# Patient Record
Sex: FEMALE | Race: ASIAN | Employment: OTHER | URBAN - METROPOLITAN AREA
[De-identification: names, ages, dates, MRNs, and addresses within clinical notes are randomized per-mention and may not be internally consistent; named-entity substitution may affect disease eponyms.]

---

## 2019-07-23 ENCOUNTER — HOSPITAL ENCOUNTER (OUTPATIENT)
Dept: ULTRASOUND IMAGING | Age: 34
Discharge: HOME | End: 2019-07-23
Payer: COMMERCIAL

## 2019-07-23 DIAGNOSIS — R60.0 LOCALIZED EDEMA: ICD-10-CM

## 2019-07-23 PROCEDURE — 93971 EXTREMITY STUDY: CPT

## 2020-07-29 ENCOUNTER — OFFICE VISIT (OUTPATIENT)
Dept: INTERNAL MEDICINE CLINIC | Age: 35
End: 2020-07-29

## 2020-07-29 VITALS
SYSTOLIC BLOOD PRESSURE: 93 MMHG | HEIGHT: 63 IN | BODY MASS INDEX: 33.42 KG/M2 | DIASTOLIC BLOOD PRESSURE: 62 MMHG | OXYGEN SATURATION: 100 % | HEART RATE: 67 BPM | WEIGHT: 188.6 LBS

## 2020-07-29 DIAGNOSIS — M25.572 ACUTE LEFT ANKLE PAIN: Primary | ICD-10-CM

## 2020-07-29 DIAGNOSIS — M79.89 LEFT LEG SWELLING: ICD-10-CM

## 2020-07-29 RX ORDER — LEVOTHYROXINE SODIUM 125 UG/1
TABLET ORAL
COMMUNITY

## 2020-07-29 NOTE — PROGRESS NOTES
Chief Complaint   Patient presents with    Ankle swelling     left ankle swelling x 6/2020, no injury     she is a 37y.o. year old female who presents for evaluation of left ankle swelling. Pain Assessment Encounter      Neel Guillaume  7/29/2020  Onset of Symptoms: 6/2020  ________________________________________________________________________  Description:pt states her ankle started swelling out of nowhere. Pt states hx of this happening 7/2019-12/2019 then swelling went away and came back 6/2020. No specific injury per patient. No pain, just limited movement. Frequency: more than 5 times a day  Pain Scale:(1-10): 0   Trauma Hx: none  Hx of similar symptoms: Yes  Radiation: NO  Duration:  continuous      Progression: is unchanged  What makes it better?: elevation   What makes it worse?: standing all day, moving around  Medications tried: none    Reviewed and agree with Nurse Note and duplicated in this note. Reviewed PmHx, RxHx, FmHx, SocHx, AllgHx and updated and dated in the chart. No family history on file. No past medical history on file. Social History     Socioeconomic History    Marital status:      Spouse name: Not on file    Number of children: Not on file    Years of education: Not on file    Highest education level: Not on file        Review of Systems - negative except as listed above      Objective:     Vitals:    07/29/20 1519   BP: 93/62   Pulse: 67   SpO2: 100%   Weight: 188 lb 9.6 oz (85.5 kg)   Height: 5' 3\" (1.6 m)       Physical Examination: General appearance - alert, well appearing, and in no distress  Back exam - full range of motion, no tenderness, palpable spasm or pain on motion  Neurological - alert, oriented, normal speech, no focal findings or movement disorder noted  Musculoskeletal - A left ankle exam was performed.   Swelling: none and mild    Palpation:  ATFL:  non-tender  CFL:  non-tender  PTFL:  non-tender  Syndesmosis Ligament:  non-tender  Deltoid ligament:  non-tender  Posterior Tibialis Tendon:  non-tender  Peroneal Tendon: non-tender  Achilles Tendon:  non-tender     Proximal Fibula:  non-tender  Proximal Tibia:  non-tender  Distal Fibula:  non-tender  Distal Tibia:  non-tender    Plantar Fascia: non-tender  Midfoot:  non-tender  Forefoot:  non-tender    ROM:  Plantar Flexion:  normal  Dorsiflexion:  normal    Squeeze Test: negative  Talar Tilt Test:  negative  Anterior Drawer:negative    Kleiger Test:  negative  Hop Test: negative  [place]: negative      Extremities - peripheral pulses normal, no pedal edema, no clubbing or cyanosis  Skin - normal coloration and turgor, no rashes, no suspicious skin lesions noted   Indications for study: ankle swelling  Limited musculoskeletal ultrasound examination was performed on the lateral left ankle with the following findings: Anterior talofibular ligament (ATFL) - long: normal linearly compact trilaminar appearance   Calcaneofibular ligament (CFL) - long: normal linearly compact trilaminar appearance   Peroneus brevis tendon - long:  normal echogenic, linearly compact fibrillar appearance from posterior lateral malleolus to base of the 5th   Peroneus brevis tendon - trans:  normal echogenic, tessellate compact fibrillar appearance from posterior lateral malleolus to base of the 5th metatarsal   Peroneus longus tendon - long:  normal echogenic, linearly compact fibrillar appearance from posterior lateral malleolus to base of the 5th   Peroneus longus tendon - trans:  normal echogenic, tessellate compact fibrillar appearance from posterior lateral malleolus to base of the 5th metatarsal   Peroneal tendon stability - trans: peroneal tendons maintained in the peroneal groove with dynamic ankle dorsiflexion and eversion     Impression: Normal left ankle ultrasound with soft tissue swelling noted around ankles    Scanned and Interpreted by Moe Butterfield MD [facility]    Assessment/ Plan:   Diagnoses and all orders for this visit:    1. Acute left ankle pain  -     XR ANKLE LT MIN 3 V; Future    2. Left leg swelling  -     DUPLEX LOWER EXT VENOUS LEFT; Future    He    Pathophysiology, recovery and rehabilitation process discussed and questions answered   Counseling for 30 Minutes of the total visit duration   Pictures and figures used as necessary   Provided reassurance   Monitor response to home exercises   Recommend activity modification   Recommend  lower impact activities-walking, Eliptical, Nordic Track, cycling or swimming   Follow up in 4 week(s)  Xray's reviewed - within normal limits             1) Remember to stay active and/or exercise regularly (I suggest 30-45 minutes daily)   2) For reliable dietary information, go to www. [email or web address].org. You may wish to consider seeing the nutritionist at [facility] [phone], also consider the [id number] I have discussed the diagnosis with the patient and the intended plan as seen in the above orders. The patient has received an after-visit summary and questions were answered concerning future plans. Medication Side Effects and Warnings were discussed with patient,  Patient Labs were reviewed and or requested, and  Patient Past Records were reviewed and or requested  yes      Pt agrees to call or return to clinic and/or go to closest ER with any worsening of symptoms. This may include, but not limited to increased fever (>100.4) with NSAIDS or Tylenol, increased edema, confusion, rash, worsening of presenting symptoms. Please note that this dictation was completed with [employer], the computer voice recognition software. Quite often unanticipated grammatical, syntax, homophones, and other interpretive errors are inadvertently transcribed by the computer software. Please disregard these errors. Please excuse any errors that have escaped final proofreading. Thank you.

## 2020-08-04 ENCOUNTER — HOSPITAL ENCOUNTER (OUTPATIENT)
Dept: ULTRASOUND IMAGING | Age: 35
Discharge: HOME | End: 2020-08-04
Attending: FAMILY MEDICINE

## 2020-08-04 ENCOUNTER — HOSPITAL ENCOUNTER (OUTPATIENT)
Dept: ULTRASOUND IMAGING | Age: 35
Discharge: HOME | End: 2020-08-04
Attending: FAMILY MEDICINE
Payer: COMMERCIAL

## 2020-08-04 DIAGNOSIS — M79.89 LEFT LEG SWELLING: ICD-10-CM

## 2020-08-04 PROCEDURE — 93971 EXTREMITY STUDY: CPT | Performed by: FAMILY MEDICINE
